# Patient Record
Sex: FEMALE | Race: WHITE | NOT HISPANIC OR LATINO
[De-identification: names, ages, dates, MRNs, and addresses within clinical notes are randomized per-mention and may not be internally consistent; named-entity substitution may affect disease eponyms.]

---

## 2017-10-11 ENCOUNTER — CHARTING TRANS (OUTPATIENT)
Dept: OTHER | Age: 32
End: 2017-10-11

## 2017-10-11 ENCOUNTER — LAB SERVICES (OUTPATIENT)
Dept: OTHER | Age: 32
End: 2017-10-11

## 2017-10-11 LAB — RAPID STREP GROUP A: POSITIVE

## 2018-09-01 ENCOUNTER — HOSPITAL ENCOUNTER (OUTPATIENT)
Age: 33
Discharge: HOME | End: 2018-09-01
Attending: EMERGENCY MEDICINE
Payer: COMMERCIAL

## 2018-09-01 VITALS
OXYGEN SATURATION: 100 % | WEIGHT: 150 LBS | HEIGHT: 67 IN | SYSTOLIC BLOOD PRESSURE: 113 MMHG | TEMPERATURE: 99 F | DIASTOLIC BLOOD PRESSURE: 69 MMHG | BODY MASS INDEX: 23.54 KG/M2 | RESPIRATION RATE: 20 BRPM | HEART RATE: 94 BPM

## 2018-09-01 DIAGNOSIS — R09.82 POSTNASAL DRIP: ICD-10-CM

## 2018-09-01 DIAGNOSIS — R09.81 SINUS CONGESTION: ICD-10-CM

## 2018-09-01 DIAGNOSIS — J30.2 SEASONAL ALLERGIC RHINITIS, UNSPECIFIED TRIGGER: Primary | ICD-10-CM

## 2018-09-01 LAB — S PYO AG THROAT QL: NEGATIVE

## 2018-09-01 PROCEDURE — 99203 OFFICE O/P NEW LOW 30 MIN: CPT

## 2018-09-01 PROCEDURE — 99202 OFFICE O/P NEW SF 15 MIN: CPT

## 2018-09-01 PROCEDURE — 87430 STREP A AG IA: CPT

## 2018-09-01 RX ORDER — CETIRIZINE HYDROCHLORIDE 5 MG/1
5 TABLET ORAL DAILY
COMMUNITY
End: 2018-12-23

## 2018-09-01 RX ORDER — FLUTICASONE PROPIONATE 50 MCG
1 SPRAY, SUSPENSION (ML) NASAL 2 TIMES DAILY PRN
Qty: 16 G | Refills: 0 | Status: SHIPPED | OUTPATIENT
Start: 2018-09-01 | End: 2018-10-01

## 2018-09-01 RX ORDER — PRENATAL VIT/IRON FUM/FOLIC AC 27MG-0.8MG
1 TABLET ORAL DAILY
COMMUNITY
End: 2018-12-23

## 2018-09-01 NOTE — ED PROVIDER NOTES
Patient Seen in: [address]    History   Patient presents with:  Sore Throat  Cough/URI    Stated Complaint: SINUS INFECTION/ SORE THROAT    HPI    33year old female 29 wga who presents with 2 months of intermittent sin oropharyngeal exudate or posterior oropharyngeal erythema. + clear fluid behind bilateral TMs   Eyes: Conjunctivae and EOM are normal. Pupils are equal, round, and reactive to light. Right eye exhibits no discharge. Left eye exhibits no discharge.    Neck 10:55 am    Follow-up:  Isaiah Magallanes MD  [address] [address]  [phone]    In 1 week  Call for next available appointment, If you need a new primary care provider        Medications Prescribed:  Current Discharge

## 2018-09-01 NOTE — ED INITIAL ASSESSMENT (HPI)
Sore throat since yesterday. Patient states that it is burning. Took zyrtec. Denies CP and SOB. Patient is 34 weeks pregnant. Patient also has allergy related symptoms and was instructed to take allergy medicine.

## 2018-11-02 VITALS
WEIGHT: 120.6 LBS | DIASTOLIC BLOOD PRESSURE: 82 MMHG | BODY MASS INDEX: 18.93 KG/M2 | HEART RATE: 78 BPM | OXYGEN SATURATION: 100 % | HEIGHT: 67 IN | TEMPERATURE: 99.1 F | RESPIRATION RATE: 18 BRPM | SYSTOLIC BLOOD PRESSURE: 110 MMHG

## 2018-12-06 ENCOUNTER — HOSPITAL ENCOUNTER (OUTPATIENT)
Age: 33
Discharge: HOME | End: 2018-12-06
Attending: FAMILY MEDICINE
Payer: COMMERCIAL

## 2018-12-06 VITALS
OXYGEN SATURATION: 100 % | WEIGHT: 126 LBS | TEMPERATURE: 98 F | HEART RATE: 88 BPM | DIASTOLIC BLOOD PRESSURE: 72 MMHG | SYSTOLIC BLOOD PRESSURE: 109 MMHG | BODY MASS INDEX: 19.78 KG/M2 | HEIGHT: 67 IN | RESPIRATION RATE: 18 BRPM

## 2018-12-06 DIAGNOSIS — J01.00 ACUTE NON-RECURRENT MAXILLARY SINUSITIS: Primary | ICD-10-CM

## 2018-12-06 PROCEDURE — 99214 OFFICE O/P EST MOD 30 MIN: CPT

## 2018-12-06 PROCEDURE — 99213 OFFICE O/P EST LOW 20 MIN: CPT

## 2018-12-06 RX ORDER — AMOXICILLIN 875 MG/1
875 TABLET, COATED ORAL 2 TIMES DAILY
Qty: 20 TABLET | Refills: 0 | Status: SHIPPED | OUTPATIENT
Start: 2018-12-06 | End: 2018-12-16

## 2018-12-06 NOTE — ED INITIAL ASSESSMENT (HPI)
Congestion and sinus pressure for 3 days now. Yellow & green phlegm. Patient took zyrtec and flonase and ibuprofen. Denies fevers. Denies N/V/D.

## 2018-12-06 NOTE — ED PROVIDER NOTES
Patient presents with:  Cough/URI      HPI:     Nikolai Cazares is a 29year old female who presents with for chief complaint of nasal congestion, sinus congestion, sinus pain, thick yellow nasal secretion   Sinus congestion for > 2 weeks.  Yellow nasal sec rhythm  GI - Abdomen: soft, non-tender; bowel sounds normal; no masses,  no organomegaly  Skin: Skin color, texture, turgor normal. No rashes or lesions      Assessment/Plan:     Diagnosis:    ICD-10-CM    1.  Acute non-recurrent maxillary sinusitis J01.00

## 2018-12-23 ENCOUNTER — HOSPITAL ENCOUNTER (OUTPATIENT)
Age: 33
Discharge: HOME | End: 2018-12-23
Attending: FAMILY MEDICINE
Payer: COMMERCIAL

## 2018-12-23 VITALS
WEIGHT: 125 LBS | SYSTOLIC BLOOD PRESSURE: 99 MMHG | DIASTOLIC BLOOD PRESSURE: 80 MMHG | BODY MASS INDEX: 19.62 KG/M2 | HEART RATE: 73 BPM | HEIGHT: 67 IN | OXYGEN SATURATION: 100 % | TEMPERATURE: 98 F | RESPIRATION RATE: 16 BRPM

## 2018-12-23 DIAGNOSIS — J02.0 STREPTOCOCCAL SORE THROAT: Primary | ICD-10-CM

## 2018-12-23 PROCEDURE — 99213 OFFICE O/P EST LOW 20 MIN: CPT

## 2018-12-23 PROCEDURE — 99214 OFFICE O/P EST MOD 30 MIN: CPT

## 2018-12-23 PROCEDURE — 87430 STREP A AG IA: CPT

## 2018-12-23 RX ORDER — FLUCONAZOLE 150 MG/1
TABLET ORAL
Refills: 0 | COMMUNITY
Start: 2018-12-19 | End: 2019-05-15

## 2018-12-23 RX ORDER — AMOXICILLIN 875 MG/1
875 TABLET, COATED ORAL 2 TIMES DAILY
Qty: 20 TABLET | Refills: 0 | Status: SHIPPED | OUTPATIENT
Start: 2018-12-23 | End: 2019-01-02

## 2018-12-23 NOTE — ED PROVIDER NOTES
Pt seen in [facility]      Stated Complaint: Patient presents with:  Sore Throat      --------------   RN assessment:     st  --------------    CC: st    HPI:  Jimbo Hudson is a 29year old female p/w co st, дмитрий, ons and hallucinations  10 point review of systems is otherwise negative.     Objective   Vitals Ranges and Last Value:  ED Triage Vitals [12/23/18 0925]   BP 99/80   Pulse 73   Resp 16   Temp 98.3 °F (36.8 °C)   Temp src Oral   SpO2 100 %   O2 Device None [employer] Impression:    Streptococcal sore throat  (primary encounter diagnosis)    Disposition: Home     Discharge    Follow-up:    Komal Sommers, DO  [address] [place] [address] [address]  [phone]    Go to   As needed, If symptoms worsen        Me

## 2018-12-23 NOTE — ED INITIAL ASSESSMENT (HPI)
Pt presents to the IC with c/o sore throat since yesterday. Mild nasal congestion. No cough or ear pain. No known fevers.

## 2019-03-27 ENCOUNTER — OFFICE VISIT (OUTPATIENT)
Dept: FAMILY MEDICINE CLINIC | Facility: CLINIC | Age: 34
End: 2019-03-27
Payer: COMMERCIAL

## 2019-03-27 VITALS
WEIGHT: 124 LBS | HEART RATE: 51 BPM | SYSTOLIC BLOOD PRESSURE: 102 MMHG | DIASTOLIC BLOOD PRESSURE: 66 MMHG | HEIGHT: 67 IN | OXYGEN SATURATION: 97 % | BODY MASS INDEX: 19.46 KG/M2

## 2019-03-27 DIAGNOSIS — M25.40 JOINT SWELLING: ICD-10-CM

## 2019-03-27 DIAGNOSIS — J30.2 SEASONAL ALLERGIES: Primary | ICD-10-CM

## 2019-03-27 PROCEDURE — 99204 OFFICE O/P NEW MOD 45 MIN: CPT | Performed by: FAMILY MEDICINE

## 2019-03-27 NOTE — PATIENT INSTRUCTIONS
The products and items listed below (the “Products”)  and their claims have not been evaluated by the Food and Drug Administration. Dietary products are not intended to treat, prevent, mitigate or cure disease.  Ultimately, you must draw your own conclusion the dose every 7 days until taking 1 tsp three times daily. Mix in liquid of choice. Reduce to lowest dose tolerated if any reactions occur. Buy it online at  http://Kumo.ScriptPad/ or at the [address]           Low Sugar, Low Carb LIFESTYLE Table SEEDS  Peanuts  Cashews  Pistachios  CONDIMENTS  Barbecue sauce  Horseradish  Ketchup  Mayonnaise  Soy sauce  White vinegar  REFINED/PROCESSED FATS & OILS  Canola oil  Fake ‘butter’ spreads  Margarine  Soybean oil  Sunflower oil  SUGARS & SUGAR SUBSTITUTES K2  Folate    Minerals  Calcium  Magnesium  Manganese  Zinc  Copper  Chromium  Iron    Amino Acids  Asparagine  Glutamine  Serine    Antioxidants  Coenzyme Q10  Cysteine  Glutathione  Selenium    Metabolites  Choline  Inositol  Carnitine  MMA (Methyl Malon

## 2019-03-27 NOTE — PROGRESS NOTES
Efrain Wren is a 29year old female. Patient presents with:  Establish Care      HPI:     Has done food sensitivities in the past  Has done food elimination in the past.   Avoiding dairy and soy for her daughter to breastfeed. Delivered 5 mos ago. Sexual activity: Not on file    Lifestyle      Physical activity:        Days per week: Not on file        Minutes per session: Not on file      Stress: Not on file    Relationships      Social connections:        Talks on phone: Not on file        Get - CANDIDA IGG/A/M AB PANEL; Future  - ASSAY, THYROID STIM HORMONE; Future  - FREE T4 (FREE THYROXINE); Future  - THYROID PEROXIDASE (TPO) AB; Future  - THYROID ANTITHYROGLOBULIN AB; Future  - FREE T3 (TRIIODOTHYRONINE);  Future  - FERRITIN; Future  - RHEUMA No orders of the defined types were placed in this encounter. Patient Instructions     The products and items listed below (the “Products”)  and their claims have not been evaluated by the Food and Drug Administration.  Dietary products are not intende A liquid supplement for gut health. This is a carbon, soil-based product that helps to rebuild the tight junctions, or important connections between cells, in the intestines.  These tight junctions get compromised by daily stress, reduced immune function an Nut butters  Walnuts  Pecans  FERMENTED FOODS  Kombucha  Kvass  DRINKS  Decaf coffee  Green tea  Vegetable juice  Vegetables HIGH SUGAR FRUITS  Bananas  Dates  Fruit juices  Grapes  Rosales  Raisins  GLUTINOUS GRAINS  Barley  [place]  Spelt  Wheat  TOXIC MEATS & The only test that provides a comprehensive extracellular and intracellular assessment of the levels of the most important vitamins, minerals, antioxidants, fatty acids and amino acids.  FreeTelegraph.it  Cost: $199 with commer Return in about 6 weeks (around 5/8/2019) for Integrative Medicine - Established (30 min). Patient affirmed understanding of plan and all questions were answered.      Antonetta Seip, DO

## 2019-05-15 ENCOUNTER — OFFICE VISIT (OUTPATIENT)
Dept: FAMILY MEDICINE CLINIC | Facility: CLINIC | Age: 34
End: 2019-05-15
Payer: COMMERCIAL

## 2019-05-15 ENCOUNTER — LAB ENCOUNTER (OUTPATIENT)
Dept: LAB | Facility: REFERENCE LAB | Age: 34
End: 2019-05-15
Attending: FAMILY MEDICINE
Payer: COMMERCIAL

## 2019-05-15 VITALS
DIASTOLIC BLOOD PRESSURE: 68 MMHG | BODY MASS INDEX: 19.62 KG/M2 | RESPIRATION RATE: 16 BRPM | SYSTOLIC BLOOD PRESSURE: 110 MMHG | OXYGEN SATURATION: 98 % | WEIGHT: 125 LBS | HEART RATE: 68 BPM | HEIGHT: 67 IN

## 2019-05-15 DIAGNOSIS — R79.89 LOW SERUM VITAMIN C: ICD-10-CM

## 2019-05-15 DIAGNOSIS — R53.82 CHRONIC FATIGUE: ICD-10-CM

## 2019-05-15 DIAGNOSIS — M25.40 JOINT SWELLING: ICD-10-CM

## 2019-05-15 DIAGNOSIS — E56.1 LOW SERUM VITAMIN K: ICD-10-CM

## 2019-05-15 DIAGNOSIS — R04.0 FREQUENT NOSEBLEEDS: ICD-10-CM

## 2019-05-15 DIAGNOSIS — J30.2 SEASONAL ALLERGIES: Primary | ICD-10-CM

## 2019-05-15 DIAGNOSIS — J30.2 SEASONAL ALLERGIES: ICD-10-CM

## 2019-05-15 DIAGNOSIS — R23.8 EASY BRUISING: ICD-10-CM

## 2019-05-15 DIAGNOSIS — R79.89 LOW VITAMIN D LEVEL: ICD-10-CM

## 2019-05-15 PROCEDURE — 86376 MICROSOMAL ANTIBODY EACH: CPT

## 2019-05-15 PROCEDURE — 85025 COMPLETE CBC W/AUTO DIFF WBC: CPT

## 2019-05-15 PROCEDURE — 86200 CCP ANTIBODY: CPT

## 2019-05-15 PROCEDURE — 86800 THYROGLOBULIN ANTIBODY: CPT

## 2019-05-15 PROCEDURE — 36415 COLL VENOUS BLD VENIPUNCTURE: CPT

## 2019-05-15 PROCEDURE — 84439 ASSAY OF FREE THYROXINE: CPT

## 2019-05-15 PROCEDURE — 86141 C-REACTIVE PROTEIN HS: CPT

## 2019-05-15 PROCEDURE — 86038 ANTINUCLEAR ANTIBODIES: CPT

## 2019-05-15 PROCEDURE — 84482 T3 REVERSE: CPT

## 2019-05-15 PROCEDURE — 84481 FREE ASSAY (FT-3): CPT

## 2019-05-15 PROCEDURE — 86431 RHEUMATOID FACTOR QUANT: CPT

## 2019-05-15 PROCEDURE — 99215 OFFICE O/P EST HI 40 MIN: CPT | Performed by: FAMILY MEDICINE

## 2019-05-15 PROCEDURE — 85652 RBC SED RATE AUTOMATED: CPT

## 2019-05-15 PROCEDURE — 84443 ASSAY THYROID STIM HORMONE: CPT

## 2019-05-15 PROCEDURE — 86628 CANDIDA ANTIBODY: CPT

## 2019-05-15 NOTE — PATIENT INSTRUCTIONS
The products and items listed below (the “Products”)  and their claims have not been evaluated by the Food and Drug Administration. Dietary products are not intended to treat, prevent, mitigate or cure disease.  Ultimately, you must draw your own conclusion Why: Vitamin D/K2 Liquid is a convenient way to supplement with both vitamins D and K2, which offer greater support for bones and the immune and cardiovascular systems together than either does individually.     Chromium Picolinate      Directions: 1 capsul

## 2019-05-15 NOTE — PROGRESS NOTES
Umu Gordon is a 29year old female. Patient presents with: Integrative Medicine Consult  Follow - Up: 6wks      HPI:     Recently getting bruises on her legs and arms. Still with nosebleeds. Has been a little sick with a cold.   Not eating dairy, Talks on phone: Not on file        Gets together: Not on file        Attends Zoroastrianism service: Not on file        Active member of club or organization: Not on file        Attends meetings of clubs or organizations: Not on file        Relationship s - CBC WITH DIFFERENTIAL WITH PLATELET; Future  - RHEUMATOID ARTHRITIS FACTOR; Future  - CYCLIC CITRULLINATE PEP. IGG; Future  - C-RP/HIGH SENSITIVITY; Future  - SED RATE, WESTERGREN (AUTOMATED); Future    2.  Joint swelling  - GT,DIRECT,REFLEX TITER + SPEC Thyroid Peroxidase (TPO) AB [E]      Thyroid Antithyroglobulin AB [E]      Free T3 (Triiodothryronine)      Reverse T3, Serum      CBC W Differential W Platelet [E]      Rheumatoid Arthritis Factor [E]      Cyclic Citrullinate Pep.  IGG [E]      C-RP/Hi Where: [employer] store or online  Brands: Solaray 1000mg Capsules or [facility] Vitamin C powder    -OR-       Vitamin D/K2 by Brady Miller    Directions: 12 drops in liquid daily  Where to Find: [email or web address]  Why: Vitamin D/K2 Liquid is a convenie

## 2019-05-22 ENCOUNTER — TELEPHONE (OUTPATIENT)
Dept: FAMILY MEDICINE CLINIC | Facility: CLINIC | Age: 34
End: 2019-05-22

## 2019-05-22 NOTE — TELEPHONE ENCOUNTER
----- Message from Farhat Finnegan DO sent at 5/22/2019  9:13 AM CDT -----  Regarding: Mail Candida diet.

## 2019-05-28 ENCOUNTER — TELEPHONE (OUTPATIENT)
Dept: FAMILY MEDICINE CLINIC | Facility: CLINIC | Age: 34
End: 2019-05-28

## 2019-05-28 NOTE — TELEPHONE ENCOUNTER
RN spoke to patient regarding her lips feeling tingly and swollen on Sunday, and took Benadryl and woke up this am with the same S & S, will wait to see if this re occurs again.  RN stated that if this gets worse to continue to take benadryl but also to go

## 2019-05-31 ENCOUNTER — HOSPITAL ENCOUNTER (OUTPATIENT)
Age: 34
Discharge: HOME | End: 2019-05-31
Attending: EMERGENCY MEDICINE
Payer: COMMERCIAL

## 2019-05-31 VITALS
WEIGHT: 125 LBS | RESPIRATION RATE: 16 BRPM | DIASTOLIC BLOOD PRESSURE: 83 MMHG | OXYGEN SATURATION: 100 % | SYSTOLIC BLOOD PRESSURE: 127 MMHG | HEART RATE: 76 BPM | TEMPERATURE: 98 F | BODY MASS INDEX: 20.09 KG/M2 | HEIGHT: 66 IN

## 2019-05-31 DIAGNOSIS — R22.0 SWELLING OF BOTH LIPS: ICD-10-CM

## 2019-05-31 DIAGNOSIS — Z91.018 FOOD ALLERGY: Primary | ICD-10-CM

## 2019-05-31 PROCEDURE — 99214 OFFICE O/P EST MOD 30 MIN: CPT

## 2019-05-31 PROCEDURE — 99213 OFFICE O/P EST LOW 20 MIN: CPT

## 2019-05-31 RX ORDER — PREDNISONE 20 MG/1
20 TABLET ORAL DAILY
Qty: 3 TABLET | Refills: 3 | Status: SHIPPED | OUTPATIENT
Start: 2019-05-31 | End: 2019-06-03

## 2019-05-31 RX ORDER — PREDNISONE 20 MG/1
20 TABLET ORAL ONCE
Status: COMPLETED | OUTPATIENT
Start: 2019-05-31 | End: 2019-05-31

## 2019-06-01 NOTE — ED PROVIDER NOTES
Patient Seen in: [address]    History   Patient presents with:  Mouth/Lip Problem    Stated Complaint: allergic reaction    HPI    Patient is a 28-year-old female who presents to immediate care complaining of lip swelli Negative. Skin: Negative for rash. Neurological: Negative for dizziness, light-headedness and headaches. Positive for stated complaint: allergic reaction  Other systems are as noted in HPI. Constitutional and vital signs reviewed.       All othe medications    predniSONE 20 MG Oral Tab  Take 1 tablet (20 mg total) by mouth daily for 3 days.   Qty: 3 tablet Refills: 3

## 2019-06-01 NOTE — ED NOTES
Lips appear red and pt c/o burning to lips as well.  Pt has tried benadryl and now may have a cold sore forming

## 2019-06-11 ENCOUNTER — OFFICE VISIT (OUTPATIENT)
Dept: FAMILY MEDICINE CLINIC | Facility: CLINIC | Age: 34
End: 2019-06-11
Payer: COMMERCIAL

## 2019-06-11 VITALS
HEART RATE: 68 BPM | HEIGHT: 67 IN | SYSTOLIC BLOOD PRESSURE: 112 MMHG | TEMPERATURE: 98 F | OXYGEN SATURATION: 98 % | WEIGHT: 127.63 LBS | DIASTOLIC BLOOD PRESSURE: 64 MMHG | BODY MASS INDEX: 20.03 KG/M2

## 2019-06-11 DIAGNOSIS — T78.3XXD ANGIOEDEMA, SUBSEQUENT ENCOUNTER: Primary | ICD-10-CM

## 2019-06-11 PROCEDURE — 99213 OFFICE O/P EST LOW 20 MIN: CPT | Performed by: PHYSICIAN ASSISTANT

## 2019-06-11 RX ORDER — PREDNISONE 10 MG/1
10 TABLET ORAL DAILY
Qty: 3 TABLET | Refills: 0 | Status: SHIPPED | OUTPATIENT
Start: 2019-06-11 | End: 2019-07-17 | Stop reason: ALTCHOICE

## 2019-06-11 NOTE — PATIENT INSTRUCTIONS
Great for eye irritation, pink eye, stye, and allergies. Safe for pregnancy, nursing, and children. Directions: First sign of stye or eye irritation take 4 capsules. Then teke 2-4 capsules daily until symptoms resolve.    Where to Find: https://

## 2019-06-11 NOTE — PROGRESS NOTES
Aleksandra Wei is a 29year old female. Patient presents with: Follow - Up: reaccuring lip irritation , redness,swelling,       HPI:   Lips were tingly thought they were dry. Peeling and turning red. Woke up with swollen lips. Took benadryl.  UC gave h Highest education level: Not on file    Occupational History      Not on file    Social Needs      Financial resource strain: Not on file      Food insecurity:        Worry: Not on file        Inability: Not on file      Transportation needs:        Me Nose: Nose normal.   Mouth/Throat: Oropharynx is clear and moist. No oropharyngeal exudate. Eyes: Pupils are equal, round, and reactive to light. Conjunctivae are normal. Right eye exhibits no discharge. Left eye exhibits no discharge.    Neck: Normal ran Patient affirmed understanding of plan and all questions were answered.      VANNA Blank

## 2019-07-17 ENCOUNTER — OFFICE VISIT (OUTPATIENT)
Dept: INTEGRATIVE MEDICINE | Facility: CLINIC | Age: 34
End: 2019-07-17
Payer: COMMERCIAL

## 2019-07-17 VITALS
BODY MASS INDEX: 19.78 KG/M2 | WEIGHT: 126 LBS | SYSTOLIC BLOOD PRESSURE: 120 MMHG | RESPIRATION RATE: 18 BRPM | DIASTOLIC BLOOD PRESSURE: 70 MMHG | HEIGHT: 67 IN | OXYGEN SATURATION: 99 % | HEART RATE: 85 BPM

## 2019-07-17 DIAGNOSIS — M25.50 ARTHRALGIA, UNSPECIFIED JOINT: ICD-10-CM

## 2019-07-17 DIAGNOSIS — T78.3XXS ANGIOEDEMA, SEQUELA: ICD-10-CM

## 2019-07-17 DIAGNOSIS — R21 RASH: ICD-10-CM

## 2019-07-17 DIAGNOSIS — J30.2 SEASONAL ALLERGIES: Primary | ICD-10-CM

## 2019-07-17 PROCEDURE — 99214 OFFICE O/P EST MOD 30 MIN: CPT | Performed by: FAMILY MEDICINE

## 2019-07-17 NOTE — PROGRESS NOTES
Umu Gordon is a 29year old female. Patient presents with: Follow - Up: pt in for follow up on lab results       HPI:   Had allergic reaction - got swelling of her lips. Used steroids and the Aller-snap to help reduce her symptoms.      Completely Physical activity:        Days per week: Not on file        Minutes per session: Not on file      Stress: Not on file    Relationships      Social connections:        Talks on phone: Not on file        Gets together: Not on file        Attends [employer] Continue diet and supplements as previously discussed. Counseled on further supplement recommendations in detail. Continue to monitor for another allergic reaction.      Given further recommendations as below    Orders Placed This Visit:  No orders of th Adults & Children 12+: Mix 0.50 mL in 1/4 cup water and sip slowly. Repeat 3-5 times daily as needed until symptoms subside. If preferred, drops may be taken directly into the mouth.       Return in about 3 months (around 10/17/2019) for Integrative Medicin

## 2019-10-22 ENCOUNTER — HOSPITAL (OUTPATIENT)
Dept: OTHER | Age: 34
End: 2019-10-22

## 2019-10-22 PROCEDURE — 25609 OPTX DST RD XART FX/EP SEP3+: CPT | Performed by: SPECIALIST/TECHNOLOGIST, OTHER

## 2019-11-18 ENCOUNTER — HOSPITAL (OUTPATIENT)
Dept: OTHER | Age: 34
End: 2019-11-18
Attending: ORTHOPAEDIC SURGERY

## 2020-01-27 ENCOUNTER — HOSPITAL ENCOUNTER (OUTPATIENT)
Age: 35
Discharge: HOME | End: 2020-01-27
Attending: EMERGENCY MEDICINE
Payer: COMMERCIAL

## 2020-01-27 VITALS
DIASTOLIC BLOOD PRESSURE: 82 MMHG | WEIGHT: 130 LBS | RESPIRATION RATE: 16 BRPM | SYSTOLIC BLOOD PRESSURE: 120 MMHG | OXYGEN SATURATION: 100 % | HEIGHT: 67 IN | HEART RATE: 70 BPM | BODY MASS INDEX: 20.4 KG/M2 | TEMPERATURE: 98 F

## 2020-01-27 DIAGNOSIS — J01.40 ACUTE NON-RECURRENT PANSINUSITIS: Primary | ICD-10-CM

## 2020-01-27 PROCEDURE — 99214 OFFICE O/P EST MOD 30 MIN: CPT

## 2020-01-27 PROCEDURE — 99213 OFFICE O/P EST LOW 20 MIN: CPT

## 2020-01-27 RX ORDER — AMOXICILLIN 875 MG/1
875 TABLET, COATED ORAL 2 TIMES DAILY
Qty: 14 TABLET | Refills: 0 | Status: SHIPPED | OUTPATIENT
Start: 2020-01-27 | End: 2020-02-03

## 2020-01-27 NOTE — ED INITIAL ASSESSMENT (HPI)
Patient is here with sinus pressure and headache for weeks now. She states she can not get rid of the pressure and pain.

## 2020-01-27 NOTE — ED PROVIDER NOTES
Patient Seen in: [address]      History   Patient presents with:  Sinus Problem    Stated Complaint: Sinus problems    HPI  She reports approximately 3 weeks of sinus congestion and pressure in the frontal and maxillary Tympanic membrane and external ear normal.      Left Ear: Tympanic membrane and external ear normal.      Nose: Mucosal edema and congestion present. Right Sinus: Maxillary sinus tenderness and frontal sinus tenderness present.       Left Sinus: Maxill follow-up        Medications Prescribed:  Current Discharge Medication List    START taking these medications    amoxicillin 875 MG Oral Tab  Take 1 tablet (875 mg total) by mouth 2 (two) times daily for 7 days.   Qty: 14 tablet Refills: 0

## 2020-04-04 ENCOUNTER — E-VISIT (OUTPATIENT)
Dept: FAMILY MEDICINE CLINIC | Facility: CLINIC | Age: 35
End: 2020-04-04

## 2020-04-04 DIAGNOSIS — H92.09 OTALGIA, UNSPECIFIED LATERALITY: Primary | ICD-10-CM

## 2020-04-04 RX ORDER — AMOXICILLIN 875 MG/1
875 TABLET, COATED ORAL 2 TIMES DAILY
Qty: 20 TABLET | Refills: 0 | Status: SHIPPED | OUTPATIENT
Start: 2020-04-04 | End: 2020-04-14

## 2020-04-04 NOTE — PROGRESS NOTES
Ian Milan is a 36year old female. HPI:   See answers to questions above. Current Outpatient Medications   Medication Sig Dispense Refill   • amoxicillin 875 MG Oral Tab Take 1 tablet (875 mg total) by mouth 2 (two) times daily for 10 days.  21

## 2022-10-27 ENCOUNTER — HOSPITAL ENCOUNTER (OUTPATIENT)
Age: 37
Discharge: HOME | End: 2022-10-27
Payer: COMMERCIAL

## 2022-10-27 VITALS
TEMPERATURE: 98 F | RESPIRATION RATE: 16 BRPM | SYSTOLIC BLOOD PRESSURE: 117 MMHG | OXYGEN SATURATION: 100 % | HEART RATE: 74 BPM | DIASTOLIC BLOOD PRESSURE: 71 MMHG

## 2022-10-27 DIAGNOSIS — R30.0 DYSURIA: Primary | ICD-10-CM

## 2022-10-27 LAB
B-HCG UR QL: NEGATIVE
BILIRUB UR QL STRIP: NEGATIVE
COLOR UR: YELLOW
GLUCOSE UR STRIP-MCNC: NEGATIVE MG/DL
KETONES UR STRIP-MCNC: NEGATIVE MG/DL
NITRITE UR QL STRIP: NEGATIVE
PH UR STRIP: 5.5 [PH]
PROT UR STRIP-MCNC: NEGATIVE MG/DL
SP GR UR STRIP: 1.02
UROBILINOGEN UR STRIP-ACNC: <2 MG/DL

## 2022-10-27 PROCEDURE — 81025 URINE PREGNANCY TEST: CPT | Performed by: NURSE PRACTITIONER

## 2022-10-27 PROCEDURE — 99213 OFFICE O/P EST LOW 20 MIN: CPT | Performed by: NURSE PRACTITIONER

## 2022-10-27 PROCEDURE — 87086 URINE CULTURE/COLONY COUNT: CPT | Performed by: NURSE PRACTITIONER

## 2022-10-27 PROCEDURE — 81002 URINALYSIS NONAUTO W/O SCOPE: CPT | Performed by: NURSE PRACTITIONER

## 2022-10-27 RX ORDER — PHENAZOPYRIDINE HYDROCHLORIDE 200 MG/1
200 TABLET, FILM COATED ORAL 3 TIMES DAILY PRN
Qty: 6 TABLET | Refills: 0 | Status: SHIPPED | OUTPATIENT
Start: 2022-10-27 | End: 2022-11-03

## 2022-10-27 RX ORDER — CEPHALEXIN 500 MG/1
500 CAPSULE ORAL 3 TIMES DAILY
Qty: 21 CAPSULE | Refills: 0 | Status: SHIPPED | OUTPATIENT
Start: 2022-10-27 | End: 2022-11-03

## 2022-10-27 NOTE — ED INITIAL ASSESSMENT (HPI)
Patient is here with lower abdominal pain that started on Monday. She states it is a constant pain that she feels like is a urinary infection.

## 2022-10-27 NOTE — DISCHARGE INSTRUCTIONS
Urine culture is pending  Pyridium for comfort for the next 2 days antibiotics as prescribed  For any new or worsening symptoms such as one-sided abdominal pain/worsening generalized pain fever vomiting -you need to go to the emergency department as discussed

## 2024-03-20 ENCOUNTER — HOSPITAL ENCOUNTER (OUTPATIENT)
Age: 39
Discharge: HOME | End: 2024-03-20
Payer: COMMERCIAL

## 2024-03-20 VITALS
RESPIRATION RATE: 16 BRPM | OXYGEN SATURATION: 100 % | SYSTOLIC BLOOD PRESSURE: 122 MMHG | TEMPERATURE: 98 F | DIASTOLIC BLOOD PRESSURE: 83 MMHG | HEART RATE: 87 BPM

## 2024-03-20 DIAGNOSIS — J02.0 STREP PHARYNGITIS: Primary | ICD-10-CM

## 2024-03-20 LAB — S PYO AG THROAT QL: POSITIVE

## 2024-03-20 PROCEDURE — 87880 STREP A ASSAY W/OPTIC: CPT | Performed by: NURSE PRACTITIONER

## 2024-03-20 PROCEDURE — 99213 OFFICE O/P EST LOW 20 MIN: CPT | Performed by: NURSE PRACTITIONER

## 2024-03-20 RX ORDER — AMOXICILLIN 500 MG/1
500 TABLET, FILM COATED ORAL 2 TIMES DAILY
Qty: 20 TABLET | Refills: 0 | Status: SHIPPED | OUTPATIENT
Start: 2024-03-20 | End: 2024-03-30

## 2024-03-20 NOTE — ED PROVIDER NOTES
Patient Seen in: [facility]      History     Chief Complaint   Patient presents with    Sore Throat     Stated Complaint: sore throat    Subjective:   HPI    38 yr old female here for evaluation of sore throat since Monday, fever since yesterday. She reports tenderness and soreness to left side of neck worsening this morning and noticed redness to throat so came in. Has been doing salt water gargles. Denies known sick contacts.    Objective:   History reviewed. No pertinent past medical history.           Past Surgical History:   Procedure Laterality Date    D & C  2015    UPPER ARM/ELBOW SURGERY UNLISTED      WISDOM TEETH REMOVED  2014                Social History     Socioeconomic History    Marital status:    Tobacco Use    Smoking status: Never    Smokeless tobacco: Never   Vaping Use    Vaping Use: Never used   Substance and Sexual Activity    Alcohol use: Yes     Comment: occ    Drug use: No              Review of Systems    Positive for stated complaint: sore throat  Other systems are as noted in HPI.  Constitutional and vital signs reviewed.      All other systems reviewed and negative except as noted above.    Physical Exam     ED Triage Vitals [03/20/24 0938]   /83   Pulse 87   Resp 16   Temp 98 °F (36.7 °C)   Temp src Temporal   SpO2 100 %   O2 Device None (Room air)       Current:/83   Pulse 87   Temp 98 °F (36.7 °C) (Temporal)   Resp 16   LMP 03/13/2024   SpO2 100%         Physical Exam  Vitals and nursing note reviewed.   Constitutional:       General: She is not in acute distress.     Appearance: She is well-developed. She is not ill-appearing or toxic-appearing.   HENT:      Head: Normocephalic and atraumatic.      Right Ear: Tympanic membrane and ear canal normal.      Left Ear: Tympanic membrane and ear canal normal.      Nose: No congestion or rhinorrhea.      Mouth/Throat:      Lips: Pink.      Mouth: Mucous membranes are moist. No oral lesions.       Pharynx: Oropharynx is clear. Uvula midline. Posterior oropharyngeal erythema present. No pharyngeal swelling, oropharyngeal exudate or uvula swelling.      Tonsils: No tonsillar exudate or tonsillar abscesses.   Eyes:      Pupils: Pupils are equal, round, and reactive to light.   Cardiovascular:      Rate and Rhythm: Normal rate.   Pulmonary:      Effort: Pulmonary effort is normal. No respiratory distress.      Breath sounds: Normal breath sounds. No wheezing.   Musculoskeletal:      Cervical back: Normal range of motion and neck supple.   Lymphadenopathy:      Cervical: Cervical adenopathy present.   Skin:     General: Skin is warm.   Neurological:      Mental Status: She is alert and oriented to person, place, and time.   Psychiatric:         Behavior: Behavior normal.               ED Course     Labs Reviewed   POCT RAPID STREP - Abnormal; Notable for the following components:       Result Value    POCT Rapid Strep Positive (*)     All other components within normal limits          ED Course as of 03/20/24 0950  ------------------------------------------------------------  Time: 03/20 0943  Value: POCT Rapid Strep(!)  Comment: (Reviewed)              MDM     38 yr old female here for evaluation of sore throat, cervical LDA, fever.    ON exam, pt well appearing. Lungs clear. BL TM normal. Pharynx with erythema, no exudates, uvula midline with no PTA. No muffled voice.    Diff dx: strep vs viral syndrome    STREP POSITIVE  Discussed PO fluids, salt water gargles, amox x 10 days, ibuprofen. Change toothbrush, infection prevention.  All questions answered. Return and ER precautions given.    Counseled: Patient, regarding diagnosis, regarding treatment plan, regarding diagnostic results, regarding prescription, I have discussed with the patient the results of tests, differential diagnosis, and warning signs and symptoms that should prompt immediate return. The patient understands these instructions and agrees to the  follow-up plan provided. There is no barriers to learning. Appropriate f/u given. Patient agrees to return for any concerns/ problems/complications.                                   Medical Decision Making      Disposition and Plan     Clinical Impression:  1. Strep pharyngitis         Disposition:  Discharge  3/20/2024  9:44 am    Follow-up:  No follow-up provider specified.        Medications Prescribed:  Discharge Medication List as of 3/20/2024  9:47 AM        START taking these medications    Details   amoxicillin 500 MG Oral Tab Take 1 tablet (500 mg total) by mouth 2 (two) times daily for 10 days., Normal, Disp-20 tablet, R-0

## 2024-03-20 NOTE — DISCHARGE INSTRUCTIONS
Increase water intake, drink water, gatorade, and stick with a soft and liquid diet until throat is feeling better.    Take ibuprofen every 6 hours for pain and swelling   AND/OR  Take tylenol every 6 hours for fever, chills, bodyaches.    Take antibiotics prescribed, finish full course!    Change toothbrush in 48 hours.  Avoid sharing utensils, cups, foods with others. Avoid kissing.    RETURN OR GO TO ED for fever > 103 despite medication, difficulty swallowing saliva, shortness of breath, worsening swelling to throat that you cannot tolerate fluids.

## 2024-08-26 ENCOUNTER — HOSPITAL ENCOUNTER (OUTPATIENT)
Age: 39
Discharge: HOME | End: 2024-08-26
Payer: COMMERCIAL

## 2024-08-26 VITALS
SYSTOLIC BLOOD PRESSURE: 119 MMHG | DIASTOLIC BLOOD PRESSURE: 75 MMHG | OXYGEN SATURATION: 100 % | TEMPERATURE: 99 F | HEART RATE: 102 BPM | RESPIRATION RATE: 18 BRPM

## 2024-08-26 DIAGNOSIS — H66.92 LEFT OTITIS MEDIA, UNSPECIFIED OTITIS MEDIA TYPE: Primary | ICD-10-CM

## 2024-08-26 PROCEDURE — 99213 OFFICE O/P EST LOW 20 MIN: CPT | Performed by: NURSE PRACTITIONER

## 2024-08-26 NOTE — ED PROVIDER NOTES
He    Patient Seen in: [facility]      History     Chief Complaint   Patient presents with    Ear Pain     Stated Complaint: Ear Pain  Subjective:   38-year-old healthy female presents for left ear pain for the past 2 days.  No drainage from the ear.  No hearing loss.  No mastoid complaints.  No recent swimming.  She has had some congestion she is associated with seasonal allergies.  She has had some chills, but no fevers.  No body aches.  No headaches or dizziness.  She is eating and drinking normally without vomiting or diarrhea.  No neck stiffness.  No rashes.  She appears nontoxic.      Objective:   History reviewed. No pertinent past medical history.         Past Surgical History:   Procedure Laterality Date    D & c  2015    Upper arm/elbow surgery unlisted      [place] teeth removed  2014              Social History     Socioeconomic History    Marital status:    Tobacco Use    Smoking status: Never    Smokeless tobacco: Never   Vaping Use    Vaping status: Never Used   Substance and Sexual Activity    Alcohol use: Yes     Comment: occ    Drug use: No            Review of Systems    Positive for stated complaint: Ear Pain     Other systems are as noted in HPI.  Constitutional and vital signs reviewed.      All other systems reviewed and negative except as noted above.    Physical Exam     ED Triage Vitals [08/26/24 1750]   /75   Pulse 102   Resp 18   Temp 98.9 °F (37.2 °C)   Temp src Temporal   SpO2 100 %   O2 Device None (Room air)     Current:/75   Pulse 102   Temp 98.9 °F (37.2 °C) (Temporal)   Resp 18   LMP 08/18/2024   SpO2 100%   Breastfeeding No     Physical Exam  Vitals and nursing note reviewed.   Constitutional:       General: She is not in acute distress.     Appearance: Normal appearance. She is not toxic-appearing.   HENT:      Head: Normocephalic.      Right Ear: Ear canal normal. A middle ear effusion is present. No mastoid tenderness. Tympanic membrane is  not erythematous.      Left Ear: Ear canal normal. A middle ear effusion is present. No mastoid tenderness. Tympanic membrane is erythematous.      Nose: Congestion present. No rhinorrhea.      Mouth/Throat:      Mouth: Mucous membranes are moist.      Pharynx: Oropharynx is clear. No oropharyngeal exudate or posterior oropharyngeal erythema.   Eyes:      Extraocular Movements: Extraocular movements intact.      Conjunctiva/sclera: Conjunctivae normal.      Pupils: Pupils are equal, round, and reactive to light.   Cardiovascular:      Rate and Rhythm: Normal rate and regular rhythm.   Pulmonary:      Breath sounds: Normal breath sounds.   Musculoskeletal:         General: Normal range of motion.      Cervical back: Normal range of motion and neck supple.   Lymphadenopathy:      Cervical: No cervical adenopathy.   Skin:     General: Skin is warm and dry.      Capillary Refill: Capillary refill takes less than 2 seconds.      Findings: No rash.   Neurological:      General: No focal deficit present.      Mental Status: She is alert and oriented to person, place, and time.   Psychiatric:         Mood and Affect: Mood normal.         Behavior: Behavior normal.         ED Course   No results found.  Labs Reviewed - No data to display    MDM     Medical Decision Making  I will treat the left otitis media with Augmentin.  We discussed Tylenol or Motrin as needed for pain or fever and follow-up as needed with her primary care doctor.    Risk  OTC drugs.  Prescription drug management.  Risk Details: Left otitis media versus externa        Disposition and Plan     Clinical Impression:  1. Left otitis media, unspecified otitis media type         Disposition:  Discharge  8/26/2024  6:15 pm    Follow-up:  PMD    Schedule an appointment as soon as possible for a visit   As needed          Medications Prescribed:  Current Discharge Medication List        START taking these medications    Details   amoxicillin clavulanate [id number]  MG Oral Tab Take 1 tablet by mouth 2 (two) times daily for 10 days.  Qty: 20 tablet, Refills: 0

## 2024-08-26 NOTE — DISCHARGE INSTRUCTIONS
Tylenol or Motrin for pain or fever. Take the Augmentin as prescribed. Follow up with your doctor as needed. Return for any concerns.

## 2024-10-19 ENCOUNTER — HOSPITAL ENCOUNTER (OUTPATIENT)
Age: 39
Discharge: HOME | End: 2024-10-19
Payer: COMMERCIAL

## 2024-10-19 VITALS
WEIGHT: 150 LBS | RESPIRATION RATE: 20 BRPM | OXYGEN SATURATION: 98 % | SYSTOLIC BLOOD PRESSURE: 110 MMHG | BODY MASS INDEX: 23.54 KG/M2 | TEMPERATURE: 98 F | HEIGHT: 67 IN | DIASTOLIC BLOOD PRESSURE: 79 MMHG | HEART RATE: 85 BPM

## 2024-10-19 DIAGNOSIS — S61.211A LACERATION OF LEFT INDEX FINGER WITHOUT FOREIGN BODY WITHOUT DAMAGE TO NAIL, INITIAL ENCOUNTER: Primary | ICD-10-CM

## 2024-10-19 NOTE — ED INITIAL ASSESSMENT (HPI)
Patient reports cutting bagels this morning with a serrating knife and cut left hand second finger. Denies taking blood thinners.

## 2024-10-19 NOTE — DISCHARGE INSTRUCTIONS
REFER TO HANDOUT FOR FURTHER DISCHARGE CARE.  MAY TAKE OVER THE COUNTER MEDICATIONS SUCH AS TYLENOL (ACETAMINOPHEN)  OR MOTRIN (IBUPROFEN) FOR PAIN.      -Keep wound and bandage on for the next 24 hrs.  -May wash wound with soap and water at least twice daily. Allow to dry and place antibiotic  ointment and band aid/dressing for 1-2 days only.  When at home- leave wound open to air.  -No soaking, taking baths, swimming in the next 5 days.  -Change bandage as directed.    -See your PMD to have sutures removed in 10-14 days.  -Please call your doctor or return if you notice signs of infection including:  a)Redness  b)Swelling  c)Foul odor  d)Discharge  e)Fever  f)Increased Pain  g)If the wound becomes warm to the touch

## 2024-10-19 NOTE — ED PROVIDER NOTES
Patient Seen in: [facility]      History     Chief Complaint   Patient presents with    Laceration/Abrasion     Stated Complaint:     Subjective:   HPI      38yo female p/w duration to left index finger after cutting a bagel this morning.  Last tetanus was 6 years ago after birth of son. Is RHD.     Objective:     History reviewed. No pertinent past medical history.           Past Surgical History:   Procedure Laterality Date    D & c  2015    Upper arm/elbow surgery unlisted      [place] teeth removed  2014                Social History     Socioeconomic History    Marital status:    Tobacco Use    Smoking status: Never    Smokeless tobacco: Never   Vaping Use    Vaping status: Never Used   Substance and Sexual Activity    Alcohol use: Yes     Comment: occ    Drug use: No              Review of Systems    Positive for stated complaint:   Other systems are as noted in HPI.  Constitutional and vital signs reviewed.      All other systems reviewed and negative except as noted above.    Physical Exam     ED Triage Vitals [10/19/24 1015]   /79   Pulse 85   Resp 20   Temp 97.8 °F (36.6 °C)   Temp src Temporal   SpO2 98 %   O2 Device None (Room air)       Current Vitals:   Vital Signs  BP: 110/79  Pulse: 85  Resp: 20  Temp: 97.8 °F (36.6 °C)  Temp src: Temporal    Oxygen Therapy  SpO2: 98 %  O2 Device: None (Room air)        Physical Exam  Vitals and nursing note reviewed.   Constitutional:       General: She is not in acute distress.     Appearance: She is not toxic-appearing.   HENT:      Head: Normocephalic.      Nose: Nose normal. No congestion or rhinorrhea.      Mouth/Throat:      Mouth: Mucous membranes are moist.   Pulmonary:      Effort: Pulmonary effort is normal. No respiratory distress.   Abdominal:      General: Abdomen is flat.   Musculoskeletal:         General: Normal range of motion.        Hands:       Cervical back: Normal range of motion.   Skin:     General: Skin is warm and  dry.      Capillary Refill: Capillary refill takes less than 2 seconds.   Neurological:      General: No focal deficit present.      Mental Status: She is alert.             ED Course   Labs Reviewed - No data to display                MDM             Medical Decision Making  39-year-old female presents with left index finger laceration.  Refused updated tetanus.     Laceration examination: single laceration, L index finger measures 2cm, not involving nail bed.    The wound was cleansed and irrigated copiously.  There was no visible foreign body within the wound. The wound was closed using a simple closure with skin adhesive.  The quality of the closure was excellent.    -Supportive care discussed    Physical exam remained stable over serial reexaminations as previously documented. External chart review completed. No recent hospitalizations for the same.      I have discussed with the patient the results of tests, differential diagnosis, and warning signs and symptoms that should prompt immediate return.  The patient understands these instructions and agrees to the follow-up plan provided.  There are no barriers to learning.   Appropriate f/u given.  Patient agrees to return for any concerns/problems/complications.    Differential diagnosis reflecting the complexity of care include: laceration, abrasion, finger bruise    Comorbidities that add complexity to management include:na    External chart review was done and was noted:Yes    History obtained by an independent source was from: Patient/Wife//Parent    Discussions of management was done with:Patient    My independent interpretation of studies of:Xray    Diagnostic tests and medications considered but not ordered were:na    Social determinants of health that affect care:NA    Shared decision making was done by Self, Patient             Disposition and Plan     Clinical Impression:  1. Laceration of left index finger without foreign body without damage to  nail, initial encounter         Disposition:  Discharge  10/19/2024 10:47 am    Follow-up:  Chidi Worrell MD  [address] [facility] [address]  [phone]                Medications Prescribed:  Discharge Medication List as of 10/19/2024 10:51 AM              Supplementary Documentation:

## 2025-06-29 ENCOUNTER — HOSPITAL ENCOUNTER (OUTPATIENT)
Age: 40
Discharge: HOME | End: 2025-06-29
Payer: COMMERCIAL

## 2025-06-29 VITALS
SYSTOLIC BLOOD PRESSURE: 124 MMHG | HEART RATE: 78 BPM | TEMPERATURE: 98 F | OXYGEN SATURATION: 100 % | DIASTOLIC BLOOD PRESSURE: 79 MMHG | RESPIRATION RATE: 18 BRPM

## 2025-06-29 DIAGNOSIS — J02.0 STREPTOCOCCAL SORE THROAT: Primary | ICD-10-CM

## 2025-06-29 LAB — S PYO AG THROAT QL: POSITIVE

## 2025-06-29 PROCEDURE — 99213 OFFICE O/P EST LOW 20 MIN: CPT

## 2025-06-29 PROCEDURE — 87880 STREP A ASSAY W/OPTIC: CPT

## 2025-06-29 RX ORDER — DEXAMETHASONE SODIUM PHOSPHATE 10 MG/ML
10 INJECTION, SOLUTION INTRAMUSCULAR; INTRAVENOUS ONCE
Status: COMPLETED | OUTPATIENT
Start: 2025-06-29 | End: 2025-06-29

## 2025-06-29 RX ORDER — AMOXICILLIN 500 MG/1
500 TABLET, FILM COATED ORAL 2 TIMES DAILY
Qty: 20 TABLET | Refills: 0 | Status: SHIPPED | OUTPATIENT
Start: 2025-06-29 | End: 2025-07-09

## 2025-06-29 NOTE — ED PROVIDER NOTES
Patient Seen in: [facility]      History   No chief complaint on file.    Stated Complaint: Sore Throat  Subjective:   Ting is a 39-year-old female presenting to the immediate care with a sore throat the past 4 days.  Patient states that she has had pain when she swallows.  Denies any difficulty swallowing or voice changes.  Patient has not measured her temperature but has had subjective fevers.  Denies any cough, congestion, rhinorrhea or other URI complaints.  She has not had any chest pain, shortness of breath, abdominal pain or vomiting.  She has decreased appetite due to the sore throat but is tolerating p.o. fluids well and is well-hydrated.   She denies any other concerns or complaints.        Objective:   History reviewed. No pertinent past medical history.         Past Surgical History:   Procedure Laterality Date    D & c  2015    Upper arm/elbow surgery unlisted      [place] teeth removed  2014              Social History     Socioeconomic History    Marital status:    Tobacco Use    Smoking status: Never    Smokeless tobacco: Never   Vaping Use    Vaping status: Never Used   Substance and Sexual Activity    Alcohol use: Yes     Comment: occ    Drug use: No            Review of Systems    Positive for stated complaint: No chief complaint on file.    Other systems are as noted in HPI.  Constitutional and vital signs reviewed.      All other systems reviewed and negative except as noted above.    Physical Exam     ED Triage Vitals [06/29/25 0824]   /79   Pulse 78   Resp 18   Temp 98.4 °F (36.9 °C)   Temp src Oral   SpO2 100 %   O2 Device None (Room air)     Current:/79   Pulse 78   Temp 98.4 °F (36.9 °C) (Oral)   Resp 18   LMP 05/28/2025 (Approximate)   SpO2 100%     Physical Exam  Vitals and nursing note reviewed.   Constitutional:       General: She is not in acute distress.     Appearance: Normal appearance. She is not ill-appearing, toxic-appearing or  diaphoretic.   HENT:      Head: Normocephalic.      Right Ear: Tympanic membrane, ear canal and external ear normal.      Left Ear: Tympanic membrane, ear canal and external ear normal.      Nose: Nose normal.      Mouth/Throat:      Mouth: Mucous membranes are moist.      Pharynx: Uvula midline. Oropharyngeal exudate and posterior oropharyngeal erythema present. No pharyngeal swelling, uvula swelling or postnasal drip.      Tonsils: Tonsillar exudate present. No tonsillar abscesses. 2+ on the right. 2+ on the left.      Comments: No trismus  Eyes:      Conjunctiva/sclera: Conjunctivae normal.   Cardiovascular:      Rate and Rhythm: Normal rate and regular rhythm.      Pulses: Normal pulses.      Heart sounds: Normal heart sounds.   Pulmonary:      Effort: Pulmonary effort is normal. No tachypnea, bradypnea, accessory muscle usage, prolonged expiration, respiratory distress or retractions.      Breath sounds: Normal breath sounds and air entry. No stridor, decreased air movement or transmitted upper airway sounds. No decreased breath sounds, wheezing, rhonchi or rales.   Abdominal:      General: Abdomen is flat.   Musculoskeletal:         General: Normal range of motion.      Cervical back: Normal range of motion.   Skin:     General: Skin is warm.      Capillary Refill: Capillary refill takes less than 2 seconds.   Neurological:      General: No focal deficit present.      Mental Status: She is alert and oriented to person, place, and time.   Psychiatric:         Mood and Affect: Mood normal.         Behavior: Behavior normal.         Thought Content: Thought content normal.         Judgment: Judgment normal.         ED Course   Radiology:    No orders to display     Labs Reviewed   POCT RAPID STREP - Abnormal; Notable for the following components:       Result Value    POCT Rapid Strep Positive (*)     All other components within normal limits       MDM     Medical Decision Making  Differential diagnoses  reflecting the complexity of care include but are not limited to viral vs bacterial etiology of sore throat, less likely peritonsillar abscess.    Comorbidities that add complexity to management include: none  History obtained by an independent source was from: patient  Patient is well appearing, non-toxic and in no acute distress.  Vital signs are stable.     Strep test was positive.  No unilateral tonsil swelling, no trismus.  Sent a prescription for amoxicillin for the strep.  Patient was given a dose of dexamethasone for tonsillar swelling and pain.  Recommended that the patient use Tylenol or Motrin for fever or pain, drink plenty of fluids.  Recommended that if the patient develops any difficulty swallowing, voice changes, chest pain, shortness of breath or any other concerning complaints they should go to the emergency department.  Otherwise recommended follow up with PCP.  Recommended replacing the patient's toothbrush after the first 72 hours of antibiotics.  Also recommended the patient not return to school/work until 24 hours of antibiotics are complete.    ED precautions discussed.  Patient (guardian) advised to follow up with PCP in 2-3 days.  Patient (guardian) agrees with this plan of care.  Patient (guardian) verbalizes understanding of discharge instructions and plan of care.        Amount and/or Complexity of Data Reviewed  Independent Historian: parent  Labs: ordered. Decision-making details documented in ED Course.    Risk  OTC drugs.  Prescription drug management.        Disposition and Plan     Clinical Impression:  1. Streptococcal sore throat         Disposition:  Discharge  6/29/2025  8:26 am    Follow-up:  Raghavendra Alaniz MD  [address] [place] [id number]  [phone]                Medications Prescribed:  Discharge Medication List as of 6/29/2025  8:28 AM        START taking these medications    Details   amoxicillin 500 MG Oral Tab Take 1 tablet (500 mg total) by mouth 2  (two) times daily for 10 days., Normal, Disp-20 tablet, R-0

## (undated) NOTE — LETTER
[address], [facility], [address] [place] [address] [place], [address]  [address] [place] [phone]  [address] [place] [address],   The labs from our regular laboratory are all back and I have the following recommendations based o 5. The ultimate goal is to use this diet framework to create a long-term lifestyle for you to follow that will support and maintain optimal health.  It is important that you feel good, enjoy life and food and don't feel like you are being imprisoned by any